# Patient Record
(demographics unavailable — no encounter records)

---

## 2024-11-18 NOTE — PHYSICAL EXAM
[General Appearance - Well Developed] : well developed [Normal Appearance] : normal appearance [Well Groomed] : well groomed [General Appearance - Well Nourished] : well nourished [No Deformities] : no deformities [General Appearance - In No Acute Distress] : no acute distress [Normal Conjunctiva] : the conjunctiva exhibited no abnormalities [Eyelids - No Xanthelasma] : the eyelids demonstrated no xanthelasmas [Normal Oral Mucosa] : normal oral mucosa [No Oral Pallor] : no oral pallor [No Oral Cyanosis] : no oral cyanosis [Normal Jugular Venous A Waves Present] : normal jugular venous A waves present [Normal Jugular Venous V Waves Present] : normal jugular venous V waves present [No Jugular Venous Caro A Waves] : no jugular venous caro A waves [Respiration, Rhythm And Depth] : normal respiratory rhythm and effort [Exaggerated Use Of Accessory Muscles For Inspiration] : no accessory muscle use [Auscultation Breath Sounds / Voice Sounds] : lungs were clear to auscultation bilaterally [Heart Rate And Rhythm] : heart rate and rhythm were normal [Heart Sounds] : normal S1 and S2 [Murmurs] : no murmurs present [Arterial Pulses Normal] : the arterial pulses were normal [Edema] : no peripheral edema present [Abdomen Soft] : soft [Abdomen Tenderness] : non-tender [Abdomen Mass (___ Cm)] : no abdominal mass palpated [Abnormal Walk] : normal gait [Gait - Sufficient For Exercise Testing] : the gait was sufficient for exercise testing [Nail Clubbing] : no clubbing of the fingernails [Cyanosis, Localized] : no localized cyanosis [Petechial Hemorrhages (___cm)] : no petechial hemorrhages [] : no ischemic changes [Oriented To Time, Place, And Person] : oriented to person, place, and time [Affect] : the affect was normal [Mood] : the mood was normal [No Anxiety] : not feeling anxious

## 2024-11-18 NOTE — END OF VISIT
[Time Spent: ___ minutes] : I have spent [unfilled] minutes of time on the encounter which excludes teaching and separately reported services.
(1) assistive equipment

## 2024-11-20 NOTE — HISTORY OF PRESENT ILLNESS
[FreeTextEntry1] : 3/13/23- Patient is feeling well. Patient denies CP, SOB, palpitations, or lightheadedness. She reports home BP of 130-140. Patient is no longer taking Aspirin due to ulcer. Patient is on Losartan 100 mg and Diltiazem 120 mg for HTN. Omeprazole 20 mg and Rosuvastatin 5 mg.   3/7/22 - Patient reports elevated BP in the mornings to 145. Patient reports that her PCP increased dose of Losartan to 100 mg since 3 months ago. She reports that there are some family matters that are bothering her lately. Patient denies CP. Patient denies SOB. Patient reports palpitations lasting seconds when she gets upset. Patient denies lightheadedness. Patient is taking Aspirin QOD due to stomach discomfort.  She is on Diltiazem 120 mg and Rosuvastatin 5 mg. Patient reports muscle ache and HA, which she attributes to cholesterol medication. I advised patient to supplement with Coq10. Patient takes  melatonin occasionally.   3/8/21 - Patient has been stable.  Patient denies CP, SOB, palpitations, or lightheadedness.  She has not been exercising because she had to take care of her mom.  Her mom passed away 2 weeks ago.  Her BP was elevated initially but was even higher on repeat measurement 160/80.  I advised patient to monitor his BP at home.  She will call me in 1 week with readings.  I will consider increasing Losartan dose.  I advised patient to wear a Holter monitor.  If PAF, will need to change ASA to DOAC.  3/5/20 - Patient denies CP, SOB, palpitations, or lightheadedness. Patient reports that she forgets to take medicine sometimes. She is not on Aspirin. She is on Losartan 50 mg and Diltiazem 120 mg. Patient reports that she exercises 1-2 hours of exercise daily. Patient had AFIB but does not feel it. I advised patient to return to wear a Holter monitor anytime.   9/5/19 - Patient denies CP, SOB, palpitations, or lightheadedness. Patient reports BP at home around 130-135. Patient has been missing doses frequently because she's taking care of her mom for the last 40 days. She tries to walk daily and is on Aspirin occasionally. FU in 6 months.   1/28/19 - Feeling well. Patient denies CP, SOB, palpitations, or lightheadedness. She is on ASA for stroke prevention. She is on Losartan 50 mg and Diltiazem 120 mg for HTN. Her BP is 130/80. FU in 6 months.

## 2024-11-20 NOTE — REASON FOR VISIT
[FreeTextEntry1] : 74-year-old female with post-op AFIB, HTN, lung CA s/p resection in 2/2018, brain tumor s/p surgery in 2014 presents for followup.   Patient was last seen on 3/13/23- Patient is feeling well. Patient denies CP, SOB, palpitations, or lightheadedness. She reports home BP of 130-140. Patient is no longer taking Aspirin due to ulcer. Patient is on Losartan 100 mg and Diltiazem 120 mg for HTN. Omeprazole 20 mg and Rosuvastatin 5 mg.   She is on  ASA 81 mg QOD.  She is on Losartan 50 mg and Diltiazem 120 mg for HTN.  Patient wore a Holter 3/8/21 and it showed average HR 54, no A. fib, no significant pause.